# Patient Record
Sex: FEMALE | Race: WHITE | NOT HISPANIC OR LATINO | Employment: FULL TIME | URBAN - METROPOLITAN AREA
[De-identification: names, ages, dates, MRNs, and addresses within clinical notes are randomized per-mention and may not be internally consistent; named-entity substitution may affect disease eponyms.]

---

## 2021-04-19 RX ORDER — NORETHINDRONE ACETATE AND ETHINYL ESTRADIOL AND FERROUS FUMARATE 1MG-20(24)
1 KIT ORAL DAILY
Qty: 28 TABLET | Refills: 0 | Status: SHIPPED | OUTPATIENT
Start: 2021-04-19 | End: 2021-10-26 | Stop reason: SDUPTHER

## 2021-04-19 RX ORDER — NORETHINDRONE ACETATE AND ETHINYL ESTRADIOL AND FERROUS FUMARATE 1MG-20(24)
1 KIT ORAL DAILY
Qty: 28 TABLET | Refills: 0 | Status: SHIPPED | OUTPATIENT
Start: 2021-04-19 | End: 2021-04-19 | Stop reason: SDUPTHER

## 2021-06-23 RX ORDER — NORETHINDRONE ACETATE AND ETHINYL ESTRADIOL AND FERROUS FUMARATE 1MG-20(24)
KIT ORAL
Qty: 84 TABLET | OUTPATIENT
Start: 2021-06-23

## 2021-08-09 RX ORDER — NORETHINDRONE ACETATE AND ETHINYL ESTRADIOL AND FERROUS FUMARATE 1MG-20(24)
KIT ORAL
Qty: 84 TABLET | OUTPATIENT
Start: 2021-08-09

## 2021-10-26 ENCOUNTER — OFFICE VISIT (OUTPATIENT)
Dept: OBSTETRICS AND GYNECOLOGY | Facility: CLINIC | Age: 21
End: 2021-10-26

## 2021-10-26 VITALS
HEIGHT: 68 IN | WEIGHT: 293 LBS | SYSTOLIC BLOOD PRESSURE: 120 MMHG | BODY MASS INDEX: 44.41 KG/M2 | DIASTOLIC BLOOD PRESSURE: 70 MMHG

## 2021-10-26 DIAGNOSIS — Z11.3 SCREENING EXAMINATION FOR SEXUALLY TRANSMITTED DISEASE: ICD-10-CM

## 2021-10-26 DIAGNOSIS — Z30.41 ENCOUNTER FOR SURVEILLANCE OF CONTRACEPTIVE PILLS: ICD-10-CM

## 2021-10-26 DIAGNOSIS — Z01.419 ROUTINE GYNECOLOGICAL EXAMINATION: Primary | ICD-10-CM

## 2021-10-26 DIAGNOSIS — E66.01 MORBID OBESITY WITH BMI OF 50.0-59.9, ADULT (HCC): ICD-10-CM

## 2021-10-26 PROCEDURE — 99385 PREV VISIT NEW AGE 18-39: CPT | Performed by: NURSE PRACTITIONER

## 2021-10-26 RX ORDER — TRAZODONE HYDROCHLORIDE 50 MG/1
50 TABLET ORAL DAILY
COMMUNITY

## 2021-10-26 RX ORDER — NORETHINDRONE ACETATE AND ETHINYL ESTRADIOL AND FERROUS FUMARATE 1MG-20(24)
1 KIT ORAL DAILY
Qty: 84 TABLET | Refills: 3 | Status: SHIPPED | OUTPATIENT
Start: 2021-10-26 | End: 2022-10-28 | Stop reason: SDUPTHER

## 2021-10-26 RX ORDER — CITALOPRAM 40 MG/1
40 TABLET ORAL DAILY
COMMUNITY

## 2021-10-26 NOTE — PROGRESS NOTES
GYN Annual Exam     CC - Here for annual exam. Patient is an established patient    Subjective   HPI  Maria Alejandra Appiah is a 20 y.o. female, who presents for annual well woman exam.   Patient's last menstrual period was 10/04/2021 (exact date)..  Periods are regular and last 4 days  Dysmenorrhea:mild, occurring premenstrually.  Patient reports problems with: none.    Partner Status: Marital Status: single.  New Partners since last visit: yes.  Desires STD Screening: yes.  HPV vaccinated? : yes    Additional OB/GYN History   Current contraception: contraceptive methods: None  Desires to: wants to re-start Lo estrin 24 contraception    Last Pap : never, age 20  Last Completed Pap Smear     This patient has no relevant Health Maintenance data.        History of abnormal Pap smear: no  Family history of uterine, colon, breast, or ovarian cancer: no  Performs monthly Self-Breast Exam: yes  Exercises Regularly:no  Feelings of Anxiety or Depression: yes - treated by PCP  Tobacco Usage?: No   OB History    No obstetric history on file.         Health Maintenance   Topic Date Due   • ANNUAL PHYSICAL  Never done   • HPV VACCINES (1 - 2-dose series) Never done   • TDAP/TD VACCINES (1 - Tdap) 11/29/2019   • HEPATITIS C SCREENING  Never done   • CHLAMYDIA SCREENING  Never done   • INFLUENZA VACCINE  08/01/2021   • COVID-19 Vaccine  Completed   • Pneumococcal Vaccine 0-64  Aged Out   • MENINGOCOCCAL VACCINE  Aged Out       The additional following portions of the patient's history were reviewed and updated as appropriate: problem list.    Review of Systems   Constitutional: Negative.    HENT: Negative.    Eyes: Negative.    Respiratory: Negative.    Cardiovascular: Negative.    Gastrointestinal: Negative.    Endocrine: Negative.    Genitourinary: Negative.    Musculoskeletal: Negative.    Skin: Negative.    Allergic/Immunologic: Negative.    Neurological: Negative.    Hematological: Negative.    Psychiatric/Behavioral: Negative.   "  All other systems reviewed and are negative.      I have reviewed and agree with the HPI, ROS, and historical information as entered above. Emma Paradaer, APRN    Objective   /70   Ht 172.7 cm (68\")   Wt (!) 156 kg (344 lb)   LMP 10/04/2021 (Exact Date)   Breastfeeding No   BMI 52.31 kg/m²     Physical Exam  Vitals and nursing note reviewed. Exam conducted with a chaperone present.   Constitutional:       Appearance: Normal appearance.   HENT:      Head: Normocephalic and atraumatic.   Cardiovascular:      Rate and Rhythm: Normal rate and regular rhythm.   Pulmonary:      Effort: Pulmonary effort is normal.      Breath sounds: Normal breath sounds.   Abdominal:      Palpations: Abdomen is soft.   Genitourinary:     Labia:         Right: No rash, tenderness or lesion.         Left: No rash, tenderness or lesion.       Vagina: Normal. No lesions.      Cervix: No cervical motion tenderness, discharge, lesion or cervical bleeding.      Uterus: Normal. Not enlarged, not fixed and not tender.       Adnexa:         Right: No mass or tenderness.          Left: No mass or tenderness.        Rectum: No external hemorrhoid.      Comments: Chaperone Present  Neurological:      Mental Status: She is alert and oriented to person, place, and time.   Psychiatric:         Behavior: Behavior normal.           Assessment/Plan     Assessment     Problem List Items Addressed This Visit     None      Visit Diagnoses     Routine gynecological examination    -  Primary    Relevant Orders    Chlamydia trachomatis, Neisseria gonorrhoeae, Trichomonas vaginalis, PCR - Swab, Cervix    Screening examination for sexually transmitted disease        Encounter for surveillance of contraceptive pills        Morbid obesity with BMI of 50.0-59.9, adult (HCC)              1. GYN annual well woman exam.       Plan     1. Encouraged use of condoms for STD prevention.  2. OCP's/Patch/Vaginal Ring - Discussed side effects of nausea, BTB, " headaches, breast tenderness and slight weight gain in the first three cycles.  Understands risks of blood clots, stroke, and theoretical risk of breast cancer.  Denies family history of blood clots.  3. Reviewed BMI and weight loss as preventative health measures.   4. RTC in 1 year or PRN with problems      Emma Sorto, APRN  10/26/2021

## 2021-10-28 LAB
C TRACH RRNA SPEC QL NAA+PROBE: NEGATIVE
N GONORRHOEA RRNA SPEC QL NAA+PROBE: NEGATIVE
T VAGINALIS DNA SPEC QL NAA+PROBE: NEGATIVE

## 2022-10-28 RX ORDER — NORETHINDRONE ACETATE AND ETHINYL ESTRADIOL AND FERROUS FUMARATE 1MG-20(24)
1 KIT ORAL DAILY
Qty: 84 TABLET | Refills: 0 | Status: SHIPPED | OUTPATIENT
Start: 2022-10-28 | End: 2023-01-05 | Stop reason: ALTCHOICE

## 2022-10-28 NOTE — TELEPHONE ENCOUNTER
Received Rx refill request  Last annual with NISHA Sorto, APRN 10/26/21  No future appt.    S/w pt- Patient notified of need for appt. Scheduled appt for 11/22/22 @ 9:45am. Rx refilled until next appt.

## 2023-01-05 ENCOUNTER — OFFICE VISIT (OUTPATIENT)
Dept: FAMILY MEDICINE CLINIC | Facility: CLINIC | Age: 23
End: 2023-01-05
Payer: COMMERCIAL

## 2023-01-05 VITALS
WEIGHT: 293 LBS | DIASTOLIC BLOOD PRESSURE: 92 MMHG | TEMPERATURE: 98.2 F | SYSTOLIC BLOOD PRESSURE: 116 MMHG | BODY MASS INDEX: 44.41 KG/M2 | HEIGHT: 68 IN | HEART RATE: 103 BPM | OXYGEN SATURATION: 97 %

## 2023-01-05 DIAGNOSIS — R05.1 ACUTE COUGH: Primary | ICD-10-CM

## 2023-01-05 PROBLEM — F31.9 BIPOLAR DISORDER: Status: ACTIVE | Noted: 2023-01-05

## 2023-01-05 PROBLEM — F41.8 MIXED ANXIETY DEPRESSIVE DISORDER: Status: ACTIVE | Noted: 2023-01-05

## 2023-01-05 PROCEDURE — 99213 OFFICE O/P EST LOW 20 MIN: CPT | Performed by: NURSE PRACTITIONER

## 2023-01-05 RX ORDER — LAMOTRIGINE 200 MG/1
200 TABLET ORAL DAILY
COMMUNITY

## 2023-01-05 RX ORDER — ALBUTEROL SULFATE 90 UG/1
AEROSOL, METERED RESPIRATORY (INHALATION)
COMMUNITY
Start: 2023-01-01 | End: 2023-02-15

## 2023-01-05 RX ORDER — ALBUTEROL SULFATE 2.5 MG/3ML
2.5 SOLUTION RESPIRATORY (INHALATION) ONCE
Status: COMPLETED | OUTPATIENT
Start: 2023-01-05 | End: 2023-01-05

## 2023-01-05 RX ORDER — TOPIRAMATE 50 MG/1
50 TABLET, FILM COATED ORAL DAILY
COMMUNITY

## 2023-01-05 RX ORDER — OLANZAPINE 10 MG/1
10 TABLET ORAL DAILY
COMMUNITY
Start: 2022-12-11

## 2023-01-05 RX ORDER — BENZONATATE 100 MG/1
CAPSULE ORAL
COMMUNITY
Start: 2023-01-02 | End: 2023-02-15

## 2023-01-05 RX ADMIN — ALBUTEROL SULFATE 2.5 MG: 2.5 SOLUTION RESPIRATORY (INHALATION) at 13:53

## 2023-01-05 NOTE — LETTER
January 5, 2023     Patient: Maria Alejandra Appiah   YOB: 2000   Date of Visit: 1/5/2023       To Whom It May Concern:    It is my medical opinion that Maria Alejandra Appiah should remain out of work until 1/8/23.           Sincerely,        ARNOLD Hernandez    CC:   No Recipients

## 2023-01-05 NOTE — PROGRESS NOTES
Chief Complaint  Hospital Follow Up Visit    Subjective        Maria Alejandra Appiah presents to [facility]  History of Present Illness She was seen in the ER for bronchitis. She began to have symptoms last week.  She was (+) for flu and these symptoms have not gone away.  Her CXR was (-). She has a lot of fatigue.  She was given an antibiotic in the ER and continues to take this.  She has a home nebulizer, but does not like to use this because it makes her so nervous and makes her anxiety worse.    Objective   Vital Signs:  /92 (BP Location: Left arm, Patient Position: Sitting, Cuff Size: Adult)   Pulse 103   Temp 98.2 °F (36.8 °C) (Temporal)   Ht 172.7 cm (68\")   Wt (!) 163 kg (359 lb 6.4 oz)   SpO2 97%   BMI 54.65 kg/m²   Estimated body mass index is 54.65 kg/m² as calculated from the following:    Height as of this encounter: 172.7 cm (68\").    Weight as of this encounter: 163 kg (359 lb 6.4 oz).          Physical Exam  Constitutional:       Appearance: She is obese.   HENT:      Right Ear: Tympanic membrane, ear canal and external ear normal.      Left Ear: Tympanic membrane, ear canal and external ear normal.      Mouth/Throat:      Mouth: Mucous membranes are moist.   Cardiovascular:      Rate and Rhythm: Normal rate and regular rhythm.   Pulmonary:      Effort: Pulmonary effort is normal. No respiratory distress.      Breath sounds: No stridor. Wheezing and rhonchi present.   Musculoskeletal:      Cervical back: Normal range of motion.   Skin:     General: Skin is warm and dry.   Neurological:      Mental Status: She is alert.        Result Review :                Assessment and Plan   Diagnoses and all orders for this visit:    1. Acute cough (Primary)  Assessment & Plan:  I gave her nebulized treatment here in the office today and she tolerated this really well.  I instructed her to use the albuterol inhaler even though it makes her anxious, this will only be  temporary.  She needs something to loosen her up.  Because of her acute anxiety and panic disorder, I did not feel like she would benefit from steroids.    Orders:  -     albuterol (PROVENTIL) nebulizer solution 0.083% 2.5 mg/3mL  -     Nebulizer Treatment           Follow Up   Return in 2 weeks (on 1/19/2023), or if symptoms worsen or fail to improve.  Patient was given instructions and counseling regarding her condition or for health maintenance advice. Please see specific information pulled into the AVS if appropriate.

## 2023-01-05 NOTE — ASSESSMENT & PLAN NOTE
I gave her nebulized treatment here in the office today and she tolerated this really well.  I instructed her to use the albuterol inhaler even though it makes her anxious, this will only be temporary.  She needs something to loosen her up.  Because of her acute anxiety and panic disorder, I did not feel like she would benefit from steroids.

## 2023-01-20 ENCOUNTER — LAB (OUTPATIENT)
Dept: FAMILY MEDICINE CLINIC | Facility: CLINIC | Age: 23
End: 2023-01-20
Payer: COMMERCIAL

## 2023-01-20 DIAGNOSIS — Z79.899 ENCOUNTER FOR LONG-TERM (CURRENT) USE OF OTHER MEDICATIONS: Primary | ICD-10-CM

## 2023-01-20 DIAGNOSIS — Z11.59 ENCOUNTER FOR HEPATITIS C SCREENING TEST FOR LOW RISK PATIENT: ICD-10-CM

## 2023-01-20 PROCEDURE — 36415 COLL VENOUS BLD VENIPUNCTURE: CPT | Performed by: NURSE PRACTITIONER

## 2023-01-21 LAB
ALBUMIN SERPL-MCNC: 4.5 G/DL (ref 3.9–5)
ALBUMIN/GLOB SERPL: 1.5 {RATIO} (ref 1.2–2.2)
ALP SERPL-CCNC: 99 IU/L (ref 44–121)
ALT SERPL-CCNC: 30 IU/L (ref 0–32)
AST SERPL-CCNC: 31 IU/L (ref 0–40)
BASOPHILS # BLD AUTO: 0.1 X10E3/UL (ref 0–0.2)
BASOPHILS NFR BLD AUTO: 1 %
BILIRUB SERPL-MCNC: 0.5 MG/DL (ref 0–1.2)
BUN SERPL-MCNC: 7 MG/DL (ref 6–20)
BUN/CREAT SERPL: 12 (ref 9–23)
CALCIUM SERPL-MCNC: 9.3 MG/DL (ref 8.7–10.2)
CHLORIDE SERPL-SCNC: 106 MMOL/L (ref 96–106)
CHOLEST SERPL-MCNC: 183 MG/DL (ref 100–199)
CK SERPL-CCNC: 81 U/L (ref 32–182)
CO2 SERPL-SCNC: 21 MMOL/L (ref 20–29)
CREAT SERPL-MCNC: 0.59 MG/DL (ref 0.57–1)
EGFRCR SERPLBLD CKD-EPI 2021: 131 ML/MIN/1.73
EOSINOPHIL # BLD AUTO: 0.4 X10E3/UL (ref 0–0.4)
EOSINOPHIL NFR BLD AUTO: 3 %
ERYTHROCYTE [DISTWIDTH] IN BLOOD BY AUTOMATED COUNT: 14.1 % (ref 11.7–15.4)
GLOBULIN SER CALC-MCNC: 3 G/DL (ref 1.5–4.5)
GLUCOSE SERPL-MCNC: 98 MG/DL (ref 70–99)
HCT VFR BLD AUTO: 39 % (ref 34–46.6)
HCV AB S/CO SERPL IA: <0.1 S/CO RATIO (ref 0–0.9)
HDLC SERPL-MCNC: 37 MG/DL
HGB BLD-MCNC: 12.7 G/DL (ref 11.1–15.9)
IMM GRANULOCYTES # BLD AUTO: 0 X10E3/UL (ref 0–0.1)
IMM GRANULOCYTES NFR BLD AUTO: 0 %
LDLC SERPL CALC-MCNC: 118 MG/DL (ref 0–99)
LYMPHOCYTES # BLD AUTO: 3.5 X10E3/UL (ref 0.7–3.1)
LYMPHOCYTES NFR BLD AUTO: 27 %
MCH RBC QN AUTO: 26.3 PG (ref 26.6–33)
MCHC RBC AUTO-ENTMCNC: 32.6 G/DL (ref 31.5–35.7)
MCV RBC AUTO: 81 FL (ref 79–97)
MONOCYTES # BLD AUTO: 0.6 X10E3/UL (ref 0.1–0.9)
MONOCYTES NFR BLD AUTO: 5 %
NEUTROPHILS # BLD AUTO: 8.5 X10E3/UL (ref 1.4–7)
NEUTROPHILS NFR BLD AUTO: 64 %
PLATELET # BLD AUTO: 314 X10E3/UL (ref 150–450)
POTASSIUM SERPL-SCNC: 4.2 MMOL/L (ref 3.5–5.2)
PROT SERPL-MCNC: 7.5 G/DL (ref 6–8.5)
RBC # BLD AUTO: 4.83 X10E6/UL (ref 3.77–5.28)
SODIUM SERPL-SCNC: 140 MMOL/L (ref 134–144)
TRIGL SERPL-MCNC: 157 MG/DL (ref 0–149)
TSH SERPL DL<=0.005 MIU/L-ACNC: 1.85 UIU/ML (ref 0.45–4.5)
VLDLC SERPL CALC-MCNC: 28 MG/DL (ref 5–40)
WBC # BLD AUTO: 13.1 X10E3/UL (ref 3.4–10.8)

## 2023-02-15 ENCOUNTER — TELEMEDICINE (OUTPATIENT)
Dept: FAMILY MEDICINE CLINIC | Facility: CLINIC | Age: 23
End: 2023-02-15
Payer: COMMERCIAL

## 2023-02-15 DIAGNOSIS — J02.9 ACUTE PHARYNGITIS, UNSPECIFIED ETIOLOGY: Primary | ICD-10-CM

## 2023-02-15 PROCEDURE — 99214 OFFICE O/P EST MOD 30 MIN: CPT | Performed by: FAMILY MEDICINE

## 2023-02-15 RX ORDER — METHYLPREDNISOLONE 4 MG/1
TABLET ORAL
Qty: 21 TABLET | Refills: 0 | Status: SHIPPED | OUTPATIENT
Start: 2023-02-15

## 2023-02-15 RX ORDER — AMOXICILLIN 875 MG/1
875 TABLET, COATED ORAL 2 TIMES DAILY
Qty: 20 TABLET | Refills: 0 | Status: SHIPPED | OUTPATIENT
Start: 2023-02-15 | End: 2023-02-20

## 2023-02-15 NOTE — PROGRESS NOTES
Telehealth E-Visit      Date: 02/15/2023   Patient Name: Maria Alejandra Appiah  : 2000   MRN: [id number]     Chief Complaint:    Chief Complaint   Patient presents with   • Sore Throat   • Headache   • Nasal Congestion   Telehealth Visit completed via MY CHART for video conferencing, patient here for telehealth visit. Patient understands the limitations of the visit with telehealth given limitations in the exam findings but patient is agreeable to this telemedicine visit due to concerns with COVID 19 exposure if patient were to be present at the office at this time    Patient location: At home  Provider location: [facility]  Patient has chosen to receive care through telemedicine the patient does give consent to use video audio for medical care today.    I have reviewed the E-Visit questionnaire and the patient's answers, my assessment and plan are listed below.     History of Present Illness: Maria Alejandra Appiah is a 22 y.o. female who is here today to discuss a few days of sore throat, nasal congestion, cough she also endorses a history of recurrent tonsillitis with her tonsils currently swollen.  No fever but excessive fatigue and lethargy.  No wheezing or trouble breathing      Subjective      Review of Systems:   Review of Systems   Constitutional: Negative.    HENT: Positive for congestion, rhinorrhea, sinus pressure, sneezing, sore throat and swollen glands.    Eyes: Negative.    Respiratory: Positive for cough.    Cardiovascular: Negative.    Gastrointestinal: Negative.    Endocrine: Negative.    Genitourinary: Negative.    Musculoskeletal: Negative.    Skin: Negative.    Allergic/Immunologic: Negative.    Neurological: Negative.    Hematological: Negative.    Psychiatric/Behavioral: Negative.        I have reviewed and the following portions of the patient's history were updated as appropriate: past family history, past medical history, past social history, past  surgical history and problem list.    Medications:     Current Outpatient Medications:   •  citalopram (CeleXA) 40 MG tablet, Take 40 mg by mouth Daily., Disp: , Rfl:   •  lamoTRIgine (LaMICtal) 200 MG tablet, Take 200 mg by mouth Daily., Disp: , Rfl:   •  OLANZapine (zyPREXA) 10 MG tablet, Take 10 mg by mouth Daily., Disp: , Rfl:   •  topiramate (TOPAMAX) 50 MG tablet, Take 50 mg by mouth Daily., Disp: , Rfl:   •  traZODone (DESYREL) 50 MG tablet, Take 50 mg by mouth Daily., Disp: , Rfl:   •  amoxicillin (AMOXIL) 875 MG tablet, Take 1 tablet by mouth 2 (Two) Times a Day for 10 days., Disp: 20 tablet, Rfl: 0  •  methylPREDNISolone (MEDROL) 4 MG dose pack, Take as directed on package instructions., Disp: 21 tablet, Rfl: 0    Allergies:   No Known Allergies    Objective     Physical Exam:  Vital Signs: There were no vitals filed for this visit.  There is no height or weight on file to calculate BMI.    Physical Exam  Vitals reviewed: Exam is done and limited to telemedicine due to COVID.           Assessment / Plan      Assessment/Plan:   Diagnoses and all orders for this visit:    1. Acute pharyngitis, unspecified etiology (Primary)  Suspect acute pharyngitis we will go ahead and do oral amoxicillin with Medrol Dosepak return precaution provided if there is no improvement of symptoms patient voiced full understanding    Other orders  -     amoxicillin (AMOXIL) 875 MG tablet; Take 1 tablet by mouth 2 (Two) Times a Day for 10 days.  Dispense: 20 tablet; Refill: 0  -     methylPREDNISolone (MEDROL) 4 MG dose pack; Take as directed on package instructions.  Dispense: 21 tablet; Refill: 0       PATIENT HAS BEEN ADVISED WHILE ON NEW MEDICATION PRESCRIBED IT IS IMPORTANT TO USE BACK UP CONTRACEPTION OR BACK UP BIRTH CONTROL AS THE USE OF THIS MEDICATION WITH PREGNANCY COULD CAUSE POTENTIAL RISKS IF PATIENT DOES BECOME PREGNANT.    MEDICATION SIDE EFFECTS AND RISKS HAVE BEEN DISCUSSED WITH PATIENT. PATIENT NOTES  UNDERSTANDING. IF ANY CONCERN OR QUESTION REGARDING MEDICATION PLEASE CONTACT.   Follow Up:   No follow-ups on file.      35 minutes were spent reviewing the patient's questionnaire, formulating a treatment plan, and relaying information to the patient via spotfluxhart.    Ethel Hardy DO  [facility]   02/16/23  11:23 EST

## 2023-02-20 ENCOUNTER — OFFICE VISIT (OUTPATIENT)
Dept: FAMILY MEDICINE CLINIC | Facility: CLINIC | Age: 23
End: 2023-02-20
Payer: COMMERCIAL

## 2023-02-20 VITALS
HEIGHT: 68 IN | WEIGHT: 293 LBS | SYSTOLIC BLOOD PRESSURE: 132 MMHG | HEART RATE: 102 BPM | TEMPERATURE: 98.9 F | BODY MASS INDEX: 44.41 KG/M2 | OXYGEN SATURATION: 96 % | DIASTOLIC BLOOD PRESSURE: 80 MMHG

## 2023-02-20 DIAGNOSIS — J02.0 STREP PHARYNGITIS: Primary | ICD-10-CM

## 2023-02-20 LAB
EXPIRATION DATE: NORMAL
INTERNAL CONTROL: NORMAL
Lab: NORMAL
S PYO AG THROAT QL: NEGATIVE

## 2023-02-20 PROCEDURE — 87880 STREP A ASSAY W/OPTIC: CPT | Performed by: PHYSICIAN ASSISTANT

## 2023-02-20 PROCEDURE — 99213 OFFICE O/P EST LOW 20 MIN: CPT | Performed by: PHYSICIAN ASSISTANT

## 2023-02-20 RX ORDER — CEPHALEXIN 500 MG/1
1000 CAPSULE ORAL 2 TIMES DAILY
Qty: 40 CAPSULE | Refills: 0 | Status: SHIPPED | OUTPATIENT
Start: 2023-02-20

## 2023-02-20 NOTE — PROGRESS NOTES
"      Patient Office Visit      Patient Name: Maria Alejandra Appiah  : 2000   MRN: [id number]     Chief Complaint:    Chief Complaint   Patient presents with   • Sore Throat       History of Present Illness: Maria Alejandra Appiah is a 22 y.o. female who is here today with continued sore throat.  She was diagnosed with strep throat last week and her throat is not getting any better on amoxicillin.  She says she has had strep about 8 times in the past year and wants to see ENT about having her tonsils removed.    Subjective      Review of Systems:         Past Medical History:   Past Medical History:   Diagnosis Date   • Anxiety    • Insomnia        Past Surgical History:   Past Surgical History:   Procedure Laterality Date   • LAPAROSCOPIC CHOLECYSTECTOMY     • WISDOM TOOTH EXTRACTION         Family History:   Family History   Problem Relation Age of Onset   • Hypertension Paternal Uncle        Social History:   Social History     Socioeconomic History   • Marital status: Single   • Number of children: 0   Tobacco Use   • Smoking status: Never     Passive exposure: Never   • Smokeless tobacco: Never   Vaping Use   • Vaping Use: Never used   Substance and Sexual Activity   • Alcohol use: Not Currently   • Drug use: Never   • Sexual activity: Yes     Partners: Male     Birth control/protection: OCP       Allergies:   No Known Allergies    Objective     Physical Exam:  Vital Signs:   Vitals:    23 1504 23 1513   BP: 140/70 132/80   BP Location: Left arm Right arm   Patient Position: Sitting Sitting   Cuff Size: Large Adult Adult   Pulse: 102    Temp: 98.9 °F (37.2 °C)    TempSrc: Temporal    SpO2: 96%    Weight: (!) 162 kg (357 lb 6.4 oz)    Height: 172.7 cm (68\")    PainSc:   5    PainLoc: Throat      Body mass index is 54.34 kg/m².        Physical Exam  Constitutional:       General: She is not in acute distress.     Appearance: Normal appearance. She is obese.   HENT:      Mouth/Throat:      Pharynx: " Pharyngeal swelling and posterior oropharyngeal erythema present.      Tonsils: 3+ on the right. 3+ on the left.   Neurological:      Mental Status: She is alert.   Psychiatric:         Mood and Affect: Mood normal.         Behavior: Behavior normal.         Thought Content: Thought content normal.         Judgment: Judgment normal.         Procedures    Assessment / Plan      Assessment/Plan:   Diagnoses and all orders for this visit:    1. Strep pharyngitis (Primary)  -     cephalexin (Keflex) 500 MG capsule; Take 2 capsules by mouth 2 (Two) Times a Day.  Dispense: 40 capsule; Refill: 0  -     Ambulatory Referral to ENT (Otolaryngology)  -     POC Rapid Strep A         Still testing positive for strep.  We will change her antibiotic to Keflex and refer to ENT to discuss tonsillectomy.    Medications:     Current Outpatient Medications:   •  citalopram (CeleXA) 40 MG tablet, Take 40 mg by mouth Daily., Disp: , Rfl:   •  lamoTRIgine (LaMICtal) 200 MG tablet, Take 200 mg by mouth Daily., Disp: , Rfl:   •  methylPREDNISolone (MEDROL) 4 MG dose pack, Take as directed on package instructions., Disp: 21 tablet, Rfl: 0  •  OLANZapine (zyPREXA) 10 MG tablet, Take 10 mg by mouth Daily., Disp: , Rfl:   •  topiramate (TOPAMAX) 50 MG tablet, Take 50 mg by mouth Daily., Disp: , Rfl:   •  traZODone (DESYREL) 50 MG tablet, Take 50 mg by mouth Daily., Disp: , Rfl:   •  cephalexin (Keflex) 500 MG capsule, Take 2 capsules by mouth 2 (Two) Times a Day., Disp: 40 capsule, Rfl: 0        Follow Up:   No follow-ups on file.    Xiomara Culp PA-C   [facility]

## 2023-05-09 ENCOUNTER — TELEPHONE (OUTPATIENT)
Dept: OBSTETRICS AND GYNECOLOGY | Facility: CLINIC | Age: 23
End: 2023-05-09
Payer: COMMERCIAL

## 2023-05-09 NOTE — TELEPHONE ENCOUNTER
Returned patient's call. States she just left ER in [place] an hour ago. Was seen for heavy period bleeding. Cycle started 5/6/23; heavy since 5/7/23. States is soaking 1-2 pads per hour since Sunday. Labs were done but no ultrasound. States pregnancy test was negative. Currently using condoms for contraception. Feels tired. Denies other problems. Advised her to follow instructions given in ER and go back to ER if symptoms worsen. Encouraged oral hydration. Appointment scheduled here for tomorrow. Fax number given, requested she contact [place] [facility] to have records sent here. Patient v/u and agreed.

## 2023-05-09 NOTE — TELEPHONE ENCOUNTER
Provider: DR WASHINGTON   Caller: NANCY PORTILLO  Relationship to Patient: SELF  Pharmacy: [facility]   Phone Number: [phone]  Reason for Call: PT HAS BEEN GOING THRU A PAD OR MORE PER HOUR  SINCE Sunday.  THIS IS VERY UNUSUAL FOR HER.  HER BACK IS HURTING AND SHE IS HAVING SOME ABDOMINAL PAIN.  SHE ALSO HAS NAUSEA.  PT DID GO TO THE [facility].  THEY DX HER WITH ABNORMAL UTERINE BLEEDING. THEY TESTED HER FOR PREGNANCY, DID SOME LAB WORK, GAVE HER SOME PHENEGRAN, STRONG IBUPROFEN, AND STARTED HER ON BIRTH CONTROL (MICROGESTIN) FOR 2 MONTHS.  THEY ADVISED PT TO F/U WITH HER OBGYN.  PT WOULD LIKE TO BE SEEN ASAP.  PLEASE CALL PT TO ADVISE   When was the patient last seen: 10/2021

## 2023-05-10 ENCOUNTER — OFFICE VISIT (OUTPATIENT)
Dept: OBSTETRICS AND GYNECOLOGY | Facility: CLINIC | Age: 23
End: 2023-05-10
Payer: COMMERCIAL

## 2023-05-10 VITALS
DIASTOLIC BLOOD PRESSURE: 80 MMHG | HEIGHT: 68 IN | SYSTOLIC BLOOD PRESSURE: 120 MMHG | WEIGHT: 293 LBS | BODY MASS INDEX: 44.41 KG/M2

## 2023-05-10 DIAGNOSIS — E66.01 MORBID OBESITY WITH BMI OF 50.0-59.9, ADULT: ICD-10-CM

## 2023-05-10 DIAGNOSIS — N92.0 MENORRHAGIA WITH REGULAR CYCLE: Primary | ICD-10-CM

## 2023-05-10 DIAGNOSIS — N93.8 DUB (DYSFUNCTIONAL UTERINE BLEEDING): ICD-10-CM

## 2023-05-10 RX ORDER — NORETHINDRONE ACETATE AND ETHINYL ESTRADIOL 1; .02 MG/1; MG/1
1 TABLET ORAL DAILY
Qty: 90 TABLET | Refills: 3 | Status: SHIPPED | OUTPATIENT
Start: 2023-05-10

## 2023-05-10 RX ORDER — NORETHINDRONE ACETATE AND ETHINYL ESTRADIOL 1; .02 MG/1; MG/1
1 TABLET ORAL DAILY
COMMUNITY
End: 2023-05-10 | Stop reason: SDUPTHER

## 2023-05-10 NOTE — PROGRESS NOTES
Chief Complaint   Patient presents with   • Menorrhagia         Subjective   HPI  Maria Alejandra Appiah is a 22 y.o. female, No obstetric history on file., her last LMP was Patient's last menstrual period was 05/06/2023 (approximate)..  She presents for a follow up evaluation of Menorrhagia and Dysmenorrhea. The patient was seen at [place] [facility] yesterday after waking up drenched in blood. She states that she has been soaking thru a pad every hour since it started. She is passing large clots and has back pain. The ER started her on OCPs to control her cycle and she started them yesterday. Her HGB in the ER was 11.2 and HCT was 35.6.  US was not done today.     Additional OB/GYN History   Last Pap :   Last Completed Pap Smear     This patient has no relevant Health Maintenance data.        History of abnormal Pap smear: no  Tobacco Usage?: No       Current Outpatient Medications:   •  norethindrone-ethinyl estradiol (MICROGESTIN 1/20) 1-20 MG-MCG per tablet, Take 1 tablet by mouth Daily., Disp: 90 tablet, Rfl: 3  •  citalopram (CeleXA) 40 MG tablet, Take 40 mg by mouth Daily., Disp: , Rfl:   •  lamoTRIgine (LaMICtal) 200 MG tablet, Take 200 mg by mouth Daily., Disp: , Rfl:   •  OLANZapine (zyPREXA) 10 MG tablet, Take 10 mg by mouth Daily., Disp: , Rfl:   •  topiramate (TOPAMAX) 50 MG tablet, Take 50 mg by mouth Daily., Disp: , Rfl:   •  traZODone (DESYREL) 50 MG tablet, Take 50 mg by mouth Daily., Disp: , Rfl:      Past Medical History:   Diagnosis Date   • Anxiety    • Insomnia         Past Surgical History:   Procedure Laterality Date   • LAPAROSCOPIC CHOLECYSTECTOMY     • WISDOM TOOTH EXTRACTION         The additional following portions of the patient's history were reviewed and updated as appropriate: allergies, current medications, past family history, past medical history, past social history, past surgical history and problem list.    Review of Systems   Genitourinary: Positive for menstrual problem.   All  "other systems reviewed and are negative.      I have reviewed and agree with the HPI, ROS, and historical information as entered above. Aurora Palacios MD    Objective   /80   Ht 172.7 cm (68\")   Wt (!) 161 kg (355 lb)   LMP 05/06/2023 (Approximate)   BMI 53.98 kg/m²     Physical Exam    General:  well developed; well nourished  no acute distress, BMI 53  Skin:  No suspicious lesions seen  Abdomen: soft, non-tender; no masses  no umbilical or inguinal hernias are present  Pelvis: Deferred  Ext: 2+ pulses, no edema    Assessment & Plan     Assessment     Problem List Items Addressed This Visit     Menorrhagia with regular cycle - Primary    Relevant Orders    US Non-ob Transvaginal    CBC (No Diff)    HCG, B-subunit, Quantitative    DHEA-Sulfate (Completed)    Estradiol (Completed)    Follicle Stimulating Hormone (Completed)    Luteinizing Hormone (Completed)    17-Hydroxyprogesterone (Completed)    TSH (Completed)    Testosterone Free Direct (Completed)    Hemoglobin A1c (Completed)    Morbid obesity with BMI of 50.0-59.9, adult    Relevant Orders    DHEA-Sulfate (Completed)    Estradiol (Completed)    Follicle Stimulating Hormone (Completed)    Luteinizing Hormone (Completed)    17-Hydroxyprogesterone (Completed)    TSH (Completed)    Testosterone Free Direct (Completed)    Hemoglobin A1c (Completed)    DUB (dysfunctional uterine bleeding)         Plan     Call for heavy bleeding  Reviewed ultrasound findings  Lab(s) Ordered  Medication(s) Ordered  Discussed potential etiologies and treatment options.   Follow Up: Return in about 3 months (around 8/10/2023) for Annual physical.      Aurora Palacios MD  05/10/2023  "

## 2023-05-11 ENCOUNTER — TELEPHONE (OUTPATIENT)
Dept: OBSTETRICS AND GYNECOLOGY | Facility: CLINIC | Age: 23
End: 2023-05-11
Payer: COMMERCIAL

## 2023-05-11 NOTE — TELEPHONE ENCOUNTER
PT has started vomiting since taking new bc past couple of days and wanting to know if it could be caused by the med's.

## 2023-05-11 NOTE — TELEPHONE ENCOUNTER
Returned patient's call. Started new OCPs 2 days ago. Onset of nausea last night. Took some Phenergan that she already had @ 0430. Emesis x2 this morning. States family members have same symptoms. Discussed with ARNOLD Kilpatrick. Advised her not likely caused by OCPs. Try to stay hydrated. Should go to ER if unable to keep anything down for 24 hours. V/U and agreed.

## 2023-05-16 LAB
17OHP SERPL-MCNC: 10 NG/DL
DHEA-S SERPL-MCNC: 149 UG/DL (ref 110–431.7)
ERYTHROCYTE [DISTWIDTH] IN BLOOD BY AUTOMATED COUNT: 14.1 % (ref 11.7–15.4)
ESTRADIOL SERPL-MCNC: 36.1 PG/ML
FSH SERPL-ACNC: 4.8 MIU/ML
HBA1C MFR BLD: 7 % (ref 4.8–5.6)
HCG INTACT+B SERPL-ACNC: <1 MIU/ML
HCT VFR BLD AUTO: 39.3 % (ref 34–46.6)
HGB BLD-MCNC: 12.6 G/DL (ref 11.1–15.9)
LH SERPL-ACNC: 5 MIU/ML
MCH RBC QN AUTO: 25.9 PG (ref 26.6–33)
MCHC RBC AUTO-ENTMCNC: 32.1 G/DL (ref 31.5–35.7)
MCV RBC AUTO: 81 FL (ref 79–97)
PLATELET # BLD AUTO: 408 X10E3/UL (ref 150–450)
RBC # BLD AUTO: 4.86 X10E6/UL (ref 3.77–5.28)
TESTOST FREE SERPL-MCNC: 0.4 PG/ML (ref 0–4.2)
TSH SERPL DL<=0.005 MIU/L-ACNC: 1.53 UIU/ML (ref 0.45–4.5)
WBC # BLD AUTO: 16.3 X10E3/UL (ref 3.4–10.8)

## 2023-05-17 PROBLEM — N93.8 DUB (DYSFUNCTIONAL UTERINE BLEEDING): Status: ACTIVE | Noted: 2023-05-17

## 2023-05-17 PROBLEM — E66.01 MORBID OBESITY WITH BMI OF 50.0-59.9, ADULT: Status: ACTIVE | Noted: 2023-05-17

## 2023-05-17 NOTE — PROGRESS NOTES
Patient is diabetic.  Please f/u with PCP re: treatment.  Otherwise, hormones appear normal.  Please work on diet/exercise/weight loss to help blood sugars and bleeding.

## 2023-05-22 ENCOUNTER — OFFICE VISIT (OUTPATIENT)
Dept: FAMILY MEDICINE CLINIC | Facility: CLINIC | Age: 23
End: 2023-05-22
Payer: COMMERCIAL

## 2023-05-22 VITALS
HEIGHT: 68 IN | HEART RATE: 84 BPM | SYSTOLIC BLOOD PRESSURE: 128 MMHG | WEIGHT: 293 LBS | OXYGEN SATURATION: 97 % | DIASTOLIC BLOOD PRESSURE: 70 MMHG | BODY MASS INDEX: 44.41 KG/M2 | TEMPERATURE: 98.2 F

## 2023-05-22 DIAGNOSIS — E11.65 TYPE 2 DIABETES MELLITUS WITH HYPERGLYCEMIA, WITHOUT LONG-TERM CURRENT USE OF INSULIN: Primary | ICD-10-CM

## 2023-05-22 DIAGNOSIS — B36.9 FUNGAL DERMATOSIS: ICD-10-CM

## 2023-05-22 LAB — GLUCOSE BLDC GLUCOMTR-MCNC: 126 MG/DL (ref 70–130)

## 2023-05-22 PROCEDURE — 99214 OFFICE O/P EST MOD 30 MIN: CPT | Performed by: NURSE PRACTITIONER

## 2023-05-22 RX ORDER — PANTOPRAZOLE SODIUM 40 MG/1
40 TABLET, DELAYED RELEASE ORAL
COMMUNITY
End: 2023-05-22

## 2023-05-22 RX ORDER — CLOTRIMAZOLE AND BETAMETHASONE DIPROPIONATE 10; .64 MG/G; MG/G
1 CREAM TOPICAL 2 TIMES DAILY
Qty: 120 G | Refills: 0 | Status: SHIPPED | OUTPATIENT
Start: 2023-05-22

## 2023-05-22 NOTE — TELEPHONE ENCOUNTER
Caller: Maria Alejandra Appiah    Relationship: Self    Best call back number:[phone]  What medication are you requesting: GLUCOSE MONITOR. EITHER ONE THAT IS WORN OR FINGER STICK KIND    What are your current symptoms: DIABETIC     How long have you been experiencing symptoms: FOUND OUT TODAY FOR SURE     Have you had these symptoms before:    [x] Yes  [] No    Have you been treated for these symptoms before:   [x] Yes  [] No    If a prescription is needed, what is your preferred pharmacy and phone number: [id number] - [place], [place] - [address]  & [address] - [phone]  - [phone]      Additional notes:    INSURANCE SAID THEY NEED A PRESCRIPTION FOR THIS TO BE COVERED

## 2023-05-22 NOTE — ASSESSMENT & PLAN NOTE
Diabetes is worsening.   Continue current treatment regimen.  Reminded to bring in blood sugar diary at next visit.  Dietary recommendations for ADA diet.  Regular aerobic exercise.  Discussed ways to avoid symptomatic hypoglycemia.  Discussed foot care.  Diabetes will be reassessed in 1 month. She was able to demonstrate how to properly do a fsbs. We discussed weight management and diet and she was given pt. Education materials.

## 2023-05-22 NOTE — PROGRESS NOTES
"Chief Complaint  Rash (R arm. Pt has tried creams. Dry and itching in nature. X2M) and Diabetes    Subjective        Maria Alejandra Appiah presents to [facility]  History of Present Illness she was told that her last A1c was 7%. She has never been treated for t his before. She states that she has a family hx of this. She states that she is always thirsty, fatigue and frequent urination. #2 large round rash on the right arm. She has had this since March. She has tried several otc creams with no results. She has some burning.     Objective   Vital Signs:  /70 (BP Location: Left arm, Patient Position: Sitting, Cuff Size: Large Adult)   Pulse 84   Temp 98.2 °F (36.8 °C) (Temporal)   Ht 172.7 cm (68\")   Wt (!) 162 kg (356 lb 1.6 oz)   SpO2 97%   BMI 54.14 kg/m²   Estimated body mass index is 54.14 kg/m² as calculated from the following:    Height as of this encounter: 172.7 cm (68\").    Weight as of this encounter: 162 kg (356 lb 1.6 oz).             Physical Exam  Vitals and nursing note reviewed.   Constitutional:       Appearance: Normal appearance.   HENT:      Head: Normocephalic and atraumatic.      Right Ear: Tympanic membrane and ear canal normal.      Left Ear: Tympanic membrane and ear canal normal.      Nose: Nose normal.      Mouth/Throat:      Pharynx: Oropharynx is clear.   Eyes:      Extraocular Movements: Extraocular movements intact.      Conjunctiva/sclera: Conjunctivae normal.      Pupils: Pupils are equal, round, and reactive to light.   Cardiovascular:      Rate and Rhythm: Normal rate and regular rhythm.      Heart sounds: Normal heart sounds.   Pulmonary:      Effort: Pulmonary effort is normal.      Breath sounds: Normal breath sounds.   Abdominal:      General: Abdomen is flat. Bowel sounds are normal. There is no distension.      Palpations: Abdomen is soft.      Tenderness: There is no abdominal tenderness. There is no guarding or rebound. "   Musculoskeletal:         General: Normal range of motion.      Cervical back: Normal range of motion and neck supple.   Skin:     General: Skin is warm and dry.   Neurological:      General: No focal deficit present.      Mental Status: She is alert and oriented to person, place, and time. Mental status is at baseline.   Psychiatric:         Mood and Affect: Mood normal.         Behavior: Behavior normal.         Thought Content: Thought content normal.         Judgment: Judgment normal.        Result Review :      Data reviewed: reviewed prevous blood work.             Assessment and Plan   Diagnoses and all orders for this visit:    1. Type 2 diabetes mellitus with hyperglycemia, without long-term current use of insulin (Primary)  Assessment & Plan:  Diabetes is worsening.   Continue current treatment regimen.  Reminded to bring in blood sugar diary at next visit.  Dietary recommendations for ADA diet.  Regular aerobic exercise.  Discussed ways to avoid symptomatic hypoglycemia.  Discussed foot care.  Diabetes will be reassessed in 1 month. She was able to demonstrate how to properly do a fsbs. We discussed weight management and diet and she was given pt. Education materials.    Orders:  -     metFORMIN (Glucophage) 500 MG tablet; Take 1 tablet by mouth 2 (Two) Times a Day With Meals.  Dispense: 180 tablet; Refill: 1    2. Fungal dermatosis  Assessment & Plan:  Prescribed a cream for this and I will see her back in one month.    Orders:  -     clotrimazole-betamethasone (LOTRISONE) 1-0.05 % cream; Apply 1 application topically to the appropriate area as directed 2 (Two) Times a Day.  Dispense: 120 g; Refill: 0         I spent 40 minutes caring for Maria Alejanrda on this date of service. This time includes time spent by me in the following activities:reviewing tests, performing a medically appropriate examination and/or evaluation , counseling and educating the patient/family/caregiver, ordering medications, tests, or  procedures and documenting information in the medical record  Follow Up   Return in about 4 weeks (around 6/19/2023) for Recheck.  Patient was given instructions and counseling regarding her condition or for health maintenance advice. Please see specific information pulled into the AVS if appropriate.       Answers for HPI/ROS submitted by the patient on 5/22/2023  Please describe your symptoms.: Blood work with high sugar and ashley A1C as well as a rash I cannot get rid of  Have you had these symptoms before?: No  How long have you been having these symptoms?: Greater than 2 weeks  What is the primary reason for your visit?: Other

## 2023-05-23 RX ORDER — LANCETS 33 GAUGE
1 EACH MISCELLANEOUS AS NEEDED
Qty: 50 EACH | Refills: 1 | Status: SHIPPED | OUTPATIENT
Start: 2023-05-23

## 2023-05-23 RX ORDER — BLOOD-GLUCOSE METER
1 KIT MISCELLANEOUS AS NEEDED
Qty: 1 EACH | Refills: 0 | Status: SHIPPED | OUTPATIENT
Start: 2023-05-23

## 2023-05-23 NOTE — TELEPHONE ENCOUNTER
Rx Refill Note    Requested Prescriptions     Pending Prescriptions Disp Refills   • glucose monitor monitoring kit 1 each 0     Si each As Needed (hyperglycemia).   • glucose blood test strip 50 each 1     Sig: Use as instructed   • Lancets 33G misc 50 each 1     Si each As Needed (hyperglycemia).        Last office visit with prescribing clinician: 2023      Next office visit with prescribing clinician: 2023   Last labs:   Last refill: needs   Pharmacy (be sure to add in Epic). correct

## 2023-05-24 ENCOUNTER — TELEPHONE (OUTPATIENT)
Dept: FAMILY MEDICINE CLINIC | Facility: CLINIC | Age: 23
End: 2023-05-24
Payer: COMMERCIAL

## 2023-05-24 NOTE — TELEPHONE ENCOUNTER
Caller: Maria Alejandra Appiah    Relationship: Self    Best call back number: [phone]    What is the best time to reach you: ANY    Who are you requesting to speak with (clinical staff, provider,  specific staff member): CLINICAL    What was the call regarding: THE PATIENT STATES THAT [id number] [place] [facility] [address]  [address] - [phone]  - [phone] FX IS REQUIRING INSTRUCTIONS FOR THE DIABETIC SUPPLIES BEFORE THEY WILL FILL THE PRESCRIPTIONS FOR THE LANCETS AND TEST STRIPS.     Do you require a callback: IF NEEDED.

## 2023-05-26 ENCOUNTER — TELEPHONE (OUTPATIENT)
Dept: FAMILY MEDICINE CLINIC | Facility: CLINIC | Age: 23
End: 2023-05-26

## 2023-05-26 NOTE — TELEPHONE ENCOUNTER
Provider: IRA     Caller: NANCY PORTILLO    Phone Number: [phone]    Reason for Call:RAJEEV WAS TOLD TO CHECK WITH INSURANCE ABOUT GETTING DEXCOM FOR DIABETES.  PATIENT STATED THAT SHE SPOKE WITH INSURANCE AND THEY STATED THEY WILL APPROVE IT

## 2023-05-26 NOTE — TELEPHONE ENCOUNTER
Caller: Bijal Maria Alejandra    Relationship: Self    Best call back number: [phone]    What is the best time to reach you: ANY    Who are you requesting to speak with (clinical staff, provider,  specific staff member): CLINICAL    What was the call regarding: THE PATIENT CALLING TO STATE THAT [facility], [facility], AND DIABETIC SUPPLIES IN [place] ARE ALL COVERED THROUGH HER INSURANCE FOR THE DEXCOM.     Do you require a callback: YES, PLEASE.    THANK YOU.

## 2023-05-30 RX ORDER — PROCHLORPERAZINE 25 MG/1
SUPPOSITORY RECTAL
Qty: 12 EACH | Refills: 3 | Status: SHIPPED | OUTPATIENT
Start: 2023-05-30

## 2023-05-30 RX ORDER — PROCHLORPERAZINE 25 MG/1
1 SUPPOSITORY RECTAL
Qty: 3 EACH | Refills: 4 | Status: SHIPPED | OUTPATIENT
Start: 2023-05-30

## 2023-06-12 NOTE — TELEPHONE ENCOUNTER
Caller: Maria Alejandra Appiah    Relationship: Self    Best call back number: [phone]    Requested Prescriptions:   Requested Prescriptions     Pending Prescriptions Disp Refills   • Continuous Blood Gluc Sensor (Dexcom G6 Sensor) 12 each 3     Sig: Every 10 (Ten) Days.        Pharmacy where request should be sent: [address] [facility] [phone]  - [phone] FX     Last office visit with prescribing clinician: 5/22/2023   Last telemedicine visit with prescribing clinician: 2/15/2023   Next office visit with prescribing clinician: 6/22/2023     Additional details provided by patient: MARIA ALEJANDRA SAID SHE RAN INTO THE DOOR IN THE NIGHT AND THE SENSOR FELL OUT AND WILL NEED REFILLS.  HER CURRENT ONE WILL LAST TO THE 062023    Does the patient have less than a 3 day supply:  [] Yes  [] No    Would you like a call back once the refill request has been completed: [x] Yes [] No    If the office needs to give you a call back, can they leave a voicemail: [] Yes [] No    Krystal Iyer Rep   06/12/23 13:32 EDT

## 2023-06-15 RX ORDER — PROCHLORPERAZINE 25 MG/1
SUPPOSITORY RECTAL
Qty: 12 EACH | Refills: 3 | Status: SHIPPED | OUTPATIENT
Start: 2023-06-15

## 2023-08-28 ENCOUNTER — TELEPHONE (OUTPATIENT)
Dept: OBSTETRICS AND GYNECOLOGY | Facility: CLINIC | Age: 23
End: 2023-08-28
Payer: COMMERCIAL

## 2023-08-28 NOTE — TELEPHONE ENCOUNTER
FRANCOISE patient.     Notified we do not need to keep annual appointment. If patient is due for pap it can be done at NOB appointment. Patient v/u. Will cancel annual.

## 2023-08-28 NOTE — TELEPHONE ENCOUNTER
Hub staff attempted to follow warm transfer process and was unsuccessful     Caller: Maria Alejandra Appiah    Relationship to patient: Self    Best call back number: [phone]    Patient is needing: PATIENT CALLED TO MAKE NEW OB APPT. PT IS SCHEDULED ON 9/21/23. PT IS SCHEDULED FOR ANNUAL ON 9/18/23. DOES SHE STILL NEED TO COME IN FOR ANNUAL EXAM?

## 2023-09-15 ENCOUNTER — TELEPHONE (OUTPATIENT)
Dept: FAMILY MEDICINE CLINIC | Facility: CLINIC | Age: 23
End: 2023-09-15

## 2023-09-15 NOTE — TELEPHONE ENCOUNTER
Caller: Maria Alejandra Appiah    Relationship: Self    Best call back number: [phone]     What is the best time to reach you: ASAP    Who are you requesting to speak with (clinical staff, provider,  specific staff member): CLINICAL       What was the call regarding: PATIENT WAS PRESCRIBED OXYCODONE 09/05/23 BY HER OBGYN DUE TO HAVING A MISCARRIAGE.    SHE NOW FEELS CONSTIPATED, SHE HAS TAKEN MIRALAX, USED SUPPOSITORIES, STOOL SOFTENERS AND LAXATIVES AND SHE STILL HASN'T HAD A BOWEL MOVEMENT     SHE IS IN SEVERE PAIN AND NEEDS SUGGESTIONS AND RECOMMENDATION ON WHAT TO DO AND/OR TAKE.       ALSO PATIENT HAS HAD AN UTI OFF AND ON FOR A MONTH OBYN HAS BEEN PRESCRIPTION MEDICATION BUT SHE THINKS SHE MAY HAVE IT AGAIN OR IT HASN'T WENT AWAY. PATIENT IS REQUESTING MEDICATION FOR BOTH CONCERNS PLEASE ADVISE AND CALL PATIENT     [id number] - [place], [place] - [address] - [phone]  - [phone] FX

## 2023-09-22 ENCOUNTER — OFFICE VISIT (OUTPATIENT)
Dept: FAMILY MEDICINE CLINIC | Facility: CLINIC | Age: 23
End: 2023-09-22
Payer: COMMERCIAL

## 2023-09-22 VITALS
HEIGHT: 68 IN | OXYGEN SATURATION: 98 % | WEIGHT: 293 LBS | SYSTOLIC BLOOD PRESSURE: 112 MMHG | DIASTOLIC BLOOD PRESSURE: 86 MMHG | TEMPERATURE: 98.4 F | HEART RATE: 86 BPM | BODY MASS INDEX: 44.41 KG/M2

## 2023-09-22 DIAGNOSIS — E11.65 TYPE 2 DIABETES MELLITUS WITH HYPERGLYCEMIA, WITHOUT LONG-TERM CURRENT USE OF INSULIN: ICD-10-CM

## 2023-09-22 DIAGNOSIS — B36.9 FUNGAL DERMATOSIS: Primary | ICD-10-CM

## 2023-09-22 PROCEDURE — 99213 OFFICE O/P EST LOW 20 MIN: CPT | Performed by: NURSE PRACTITIONER

## 2023-09-22 RX ORDER — HYDROXYZINE HYDROCHLORIDE 25 MG/1
25 TABLET, FILM COATED ORAL 3 TIMES DAILY PRN
COMMUNITY

## 2023-09-22 RX ORDER — NORETHINDRONE ACETATE AND ETHINYL ESTRADIOL 1MG-20(21)
1 KIT ORAL DAILY
COMMUNITY

## 2023-09-22 RX ORDER — CITALOPRAM 40 MG/1
40 TABLET ORAL DAILY
COMMUNITY

## 2023-09-22 RX ORDER — CEFDINIR 300 MG/1
300 CAPSULE ORAL EVERY 12 HOURS
COMMUNITY
Start: 2023-09-15 | End: 2023-09-22

## 2023-09-23 LAB
ALBUMIN SERPL-MCNC: 4.4 G/DL (ref 4–5)
ALBUMIN/GLOB SERPL: 1.4 {RATIO} (ref 1.2–2.2)
ALP SERPL-CCNC: 106 IU/L (ref 44–121)
ALT SERPL-CCNC: 22 IU/L (ref 0–32)
ANA SER QL: NEGATIVE
AST SERPL-CCNC: 23 IU/L (ref 0–40)
BILIRUB SERPL-MCNC: 0.5 MG/DL (ref 0–1.2)
BUN SERPL-MCNC: 6 MG/DL (ref 6–20)
BUN/CREAT SERPL: 10 (ref 9–23)
CALCIUM SERPL-MCNC: 9.4 MG/DL (ref 8.7–10.2)
CHLORIDE SERPL-SCNC: 100 MMOL/L (ref 96–106)
CO2 SERPL-SCNC: 24 MMOL/L (ref 20–29)
CREAT SERPL-MCNC: 0.62 MG/DL (ref 0.57–1)
CRP SERPL-MCNC: 31 MG/L (ref 0–10)
EGFRCR SERPLBLD CKD-EPI 2021: 129 ML/MIN/1.73
GLOBULIN SER CALC-MCNC: 3.2 G/DL (ref 1.5–4.5)
GLUCOSE SERPL-MCNC: 89 MG/DL (ref 70–99)
HBA1C MFR BLD: 5.9 % (ref 4.8–5.6)
POTASSIUM SERPL-SCNC: 4.2 MMOL/L (ref 3.5–5.2)
PROT SERPL-MCNC: 7.6 G/DL (ref 6–8.5)
RHEUMATOID FACT SERPL-ACNC: <10 IU/ML
SODIUM SERPL-SCNC: 139 MMOL/L (ref 134–144)

## 2023-11-16 ENCOUNTER — TELEMEDICINE (OUTPATIENT)
Dept: FAMILY MEDICINE CLINIC | Facility: TELEHEALTH | Age: 23
End: 2023-11-16
Payer: COMMERCIAL

## 2023-11-16 DIAGNOSIS — H66.92 LEFT OTITIS MEDIA, UNSPECIFIED OTITIS MEDIA TYPE: Primary | ICD-10-CM

## 2023-11-16 PROBLEM — B36.9 FUNGAL DERMATOSIS: Status: RESOLVED | Noted: 2023-05-22 | Resolved: 2023-11-16

## 2023-11-16 PROBLEM — N93.8 DUB (DYSFUNCTIONAL UTERINE BLEEDING): Status: RESOLVED | Noted: 2023-05-17 | Resolved: 2023-11-16

## 2023-11-16 PROBLEM — N92.0 MENORRHAGIA WITH REGULAR CYCLE: Status: RESOLVED | Noted: 2023-05-10 | Resolved: 2023-11-16

## 2023-11-16 PROBLEM — R05.1 ACUTE COUGH: Status: RESOLVED | Noted: 2023-01-05 | Resolved: 2023-11-16

## 2023-11-16 RX ORDER — AMOXICILLIN 875 MG/1
875 TABLET, COATED ORAL 2 TIMES DAILY
Qty: 20 TABLET | Refills: 0 | Status: SHIPPED | OUTPATIENT
Start: 2023-11-16 | End: 2023-11-26

## 2023-11-16 NOTE — PROGRESS NOTES
You have chosen to receive care through a telehealth visit.  Do you consent to use a video/audio connection for your medical care today? Yes     CHIEF COMPLAINT  Chief Complaint   Patient presents with    Earache         HPI  Maria Alejandra Appiah is a 22 y.o. female  presents with complaint of left ear pain that started 2 days ago.  She states that she has a history of ear infections.    Review of Systems   HENT:  Positive for ear pain (left).    All other systems reviewed and are negative.      Past Medical History:   Diagnosis Date    Anxiety     Insomnia        Family History   Problem Relation Age of Onset    Hypertension Paternal Uncle        Social History     Socioeconomic History    Marital status: Single    Number of children: 0   Tobacco Use    Smoking status: Never     Passive exposure: Never    Smokeless tobacco: Never   Vaping Use    Vaping Use: Never used   Substance and Sexual Activity    Alcohol use: Not Currently    Drug use: Never    Sexual activity: Yes     Partners: Male     Birth control/protection: OCP       Maria Alejandra Appiah  reports that she has never smoked. She has never been exposed to tobacco smoke. She has never used smokeless tobacco.       There were no vitals taken for this visit.    PHYSICAL EXAM  Physical Exam   Constitutional: She appears well-developed and well-nourished.   HENT:   Head: Normocephalic.   Eyes: Pupils are equal, round, and reactive to light.   Pulmonary/Chest: Effort normal.   Musculoskeletal: Normal range of motion.   Neurological: She is alert.   Psychiatric: She has a normal mood and affect.       Results for orders placed or performed in visit on 09/22/23   Hemoglobin A1c    Specimen: Arm, Left; Blood   Result Value Ref Range    Hemoglobin A1C 5.9 (H) 4.8 - 5.6 %   LUCILLE    Specimen: Arm, Left; Blood   Result Value Ref Range    LUCILLE Direct Negative Negative   C-reactive protein    Specimen: Arm, Left; Blood   Result Value Ref Range    C-Reactive Protein 31 (H) 0 - 10  mg/L   Rheumatoid Factor    Specimen: Arm, Left; Blood   Result Value Ref Range    RA Latex Turbid <10.0 <14.0 IU/mL   Comprehensive metabolic panel    Specimen: Arm, Left; Blood   Result Value Ref Range    Glucose 89 70 - 99 mg/dL    BUN 6 6 - 20 mg/dL    Creatinine 0.62 0.57 - 1.00 mg/dL    EGFR Result 129 >59 mL/min/1.73    BUN/Creatinine Ratio 10 9 - 23    Sodium 139 134 - 144 mmol/L    Potassium 4.2 3.5 - 5.2 mmol/L    Chloride 100 96 - 106 mmol/L    Total CO2 24 20 - 29 mmol/L    Calcium 9.4 8.7 - 10.2 mg/dL    Total Protein 7.6 6.0 - 8.5 g/dL    Albumin 4.4 4.0 - 5.0 g/dL    Globulin 3.2 1.5 - 4.5 g/dL    A/G Ratio 1.4 1.2 - 2.2    Total Bilirubin 0.5 0.0 - 1.2 mg/dL    Alkaline Phosphatase 106 44 - 121 IU/L    AST (SGOT) 23 0 - 40 IU/L    ALT (SGPT) 22 0 - 32 IU/L       Diagnoses and all orders for this visit:    1. Left otitis media, unspecified otitis media type (Primary)  -     amoxicillin (AMOXIL) 875 MG tablet; Take 1 tablet by mouth 2 (Two) Times a Day for 10 days.  Dispense: 20 tablet; Refill: 0          FOLLOW-UP  As discussed during visit with PCP/[facility] if no improvement or Urgent Care/Emergency Department if worsening of symptoms    Patient verbalizes understanding of medication dosage, comfort measures, instructions for treatment and follow-up.    Nicole Arnold, APRN  11/16/2023  06:15 EST    The use of a video visit has been reviewed with the patient and verbal informed consent has been obtained. Myself and Maria Alejandra Appiah participated in this visit. The patient is located in [address] [place] [address].    I am located in [place], [place]. Mychart and Twilio were utilized. I spent 10 minutes in the patient's chart for this visit.

## 2024-02-16 ENCOUNTER — OFFICE VISIT (OUTPATIENT)
Dept: FAMILY MEDICINE CLINIC | Facility: CLINIC | Age: 24
End: 2024-02-16
Payer: COMMERCIAL

## 2024-02-16 VITALS
SYSTOLIC BLOOD PRESSURE: 122 MMHG | DIASTOLIC BLOOD PRESSURE: 68 MMHG | BODY MASS INDEX: 44.41 KG/M2 | HEART RATE: 85 BPM | WEIGHT: 293 LBS | OXYGEN SATURATION: 98 % | HEIGHT: 68 IN

## 2024-02-16 DIAGNOSIS — N30.00 ACUTE CYSTITIS WITHOUT HEMATURIA: Primary | ICD-10-CM

## 2024-02-16 DIAGNOSIS — R30.0 DYSURIA: ICD-10-CM

## 2024-02-16 LAB
BILIRUB BLD-MCNC: NEGATIVE MG/DL
CLARITY, POC: ABNORMAL
COLOR UR: YELLOW
EXPIRATION DATE: ABNORMAL
GLUCOSE UR STRIP-MCNC: NEGATIVE MG/DL
KETONES UR QL: NEGATIVE
LEUKOCYTE EST, POC: ABNORMAL
Lab: ABNORMAL
NITRITE UR-MCNC: NEGATIVE MG/ML
PH UR: 6.5 [PH] (ref 5–8)
PROT UR STRIP-MCNC: ABNORMAL MG/DL
RBC # UR STRIP: NEGATIVE /UL
SP GR UR: 1.03 (ref 1–1.03)
UROBILINOGEN UR QL: NORMAL

## 2024-02-16 PROCEDURE — 99213 OFFICE O/P EST LOW 20 MIN: CPT | Performed by: STUDENT IN AN ORGANIZED HEALTH CARE EDUCATION/TRAINING PROGRAM

## 2024-02-16 PROCEDURE — 81003 URINALYSIS AUTO W/O SCOPE: CPT | Performed by: STUDENT IN AN ORGANIZED HEALTH CARE EDUCATION/TRAINING PROGRAM

## 2024-02-16 RX ORDER — PRENATAL VIT NO.126/IRON/FOLIC 28MG-0.8MG
1 TABLET ORAL DAILY
COMMUNITY

## 2024-02-16 RX ORDER — DIPHENHYDRAMINE HYDROCHLORIDE 25 MG/1
25 CAPSULE ORAL 3 TIMES DAILY
COMMUNITY

## 2024-02-16 RX ORDER — CEPHALEXIN 500 MG/1
500 CAPSULE ORAL 4 TIMES DAILY
Qty: 28 CAPSULE | Refills: 0 | Status: SHIPPED | OUTPATIENT
Start: 2024-02-16 | End: 2024-02-23

## 2024-02-18 LAB
BACTERIA UR CULT: NORMAL
BACTERIA UR CULT: NORMAL

## 2024-03-08 ENCOUNTER — TELEPHONE (OUTPATIENT)
Dept: FAMILY MEDICINE CLINIC | Facility: CLINIC | Age: 24
End: 2024-03-08
Payer: COMMERCIAL

## 2024-03-08 NOTE — TELEPHONE ENCOUNTER
HUB TO RELAY    PLEASE LET PATIENT KNOW HER APPOINTMENT ON 3/22 WITH DR. BANEGAS IS NEEDING TO BE RESCHEDULED DUE TO DR. BANEGAS BEING OUT OF OFFICE.     ATTEMPTED TO LEAVE 2 VOICEMAILS MAKING PATIENT AWARE.

## 2024-04-04 ENCOUNTER — TRANSCRIBE ORDERS (OUTPATIENT)
Dept: OBSTETRICS AND GYNECOLOGY | Facility: HOSPITAL | Age: 24
End: 2024-04-04
Payer: COMMERCIAL

## 2024-04-04 DIAGNOSIS — O28.5 ABNORMAL GENETIC TEST DURING PREGNANCY: Primary | ICD-10-CM

## 2024-04-04 DIAGNOSIS — O99.210 OBESITY IN PREGNANCY, ANTEPARTUM: ICD-10-CM

## 2024-04-04 DIAGNOSIS — Z34.90 PREGNANCY, UNSPECIFIED GESTATIONAL AGE: ICD-10-CM

## 2024-04-04 DIAGNOSIS — O24.119 TYPE 2 DIABETES MELLITUS COMPLICATING PREGNANCY, ANTEPARTUM: ICD-10-CM

## 2024-04-08 ENCOUNTER — LAB (OUTPATIENT)
Dept: LAB | Facility: HOSPITAL | Age: 24
End: 2024-04-08
Payer: COMMERCIAL

## 2024-04-08 ENCOUNTER — OFFICE VISIT (OUTPATIENT)
Dept: OBSTETRICS AND GYNECOLOGY | Facility: HOSPITAL | Age: 24
End: 2024-04-08
Payer: COMMERCIAL

## 2024-04-08 ENCOUNTER — HOSPITAL ENCOUNTER (OUTPATIENT)
Dept: WOMENS IMAGING | Facility: HOSPITAL | Age: 24
Discharge: HOME | End: 2024-04-08
Payer: COMMERCIAL

## 2024-04-08 VITALS — DIASTOLIC BLOOD PRESSURE: 72 MMHG | BODY MASS INDEX: 49.73 KG/M2 | SYSTOLIC BLOOD PRESSURE: 142 MMHG | WEIGHT: 293 LBS

## 2024-04-08 DIAGNOSIS — E11.65 TYPE 2 DIABETES MELLITUS WITH HYPERGLYCEMIA, WITHOUT LONG-TERM CURRENT USE OF INSULIN: ICD-10-CM

## 2024-04-08 DIAGNOSIS — E11.65 TYPE 2 DIABETES MELLITUS WITH HYPERGLYCEMIA, WITHOUT LONG-TERM CURRENT USE OF INSULIN: Primary | ICD-10-CM

## 2024-04-08 DIAGNOSIS — O28.5 ABNORMAL GENETIC TEST DURING PREGNANCY: ICD-10-CM

## 2024-04-08 DIAGNOSIS — O24.119 TYPE 2 DIABETES MELLITUS COMPLICATING PREGNANCY, ANTEPARTUM: ICD-10-CM

## 2024-04-08 DIAGNOSIS — O99.210 OBESITY IN PREGNANCY, ANTEPARTUM: ICD-10-CM

## 2024-04-08 DIAGNOSIS — E66.01 CLASS 3 SEVERE OBESITY WITH SERIOUS COMORBIDITY AND BODY MASS INDEX (BMI) OF 45.0 TO 49.9 IN ADULT, UNSPECIFIED OBESITY TYPE: ICD-10-CM

## 2024-04-08 DIAGNOSIS — Z34.90 PREGNANCY, UNSPECIFIED GESTATIONAL AGE: ICD-10-CM

## 2024-04-08 PROBLEM — E66.9 OBESITY: Status: ACTIVE | Noted: 2023-05-17

## 2024-04-08 PROCEDURE — 76815 OB US LIMITED FETUS(S): CPT

## 2024-04-08 PROCEDURE — 36415 COLL VENOUS BLD VENIPUNCTURE: CPT

## 2024-04-08 PROCEDURE — 99204 OFFICE O/P NEW MOD 45 MIN: CPT | Performed by: OBSTETRICS & GYNECOLOGY

## 2024-04-08 NOTE — LETTER
"2024     Rosario Botello DO  [address]  [address]  [facility] [address]    Patient: Maria Alejandra Appiah   YOB: 2000   Date of Visit: 2024       Dear Rosario Botello DO,    Thank you for referring Maria Alejandra Appiah to me for evaluation. Below is a copy of my consult note.    If you have questions, please do not hesitate to call me. I look forward to following Maria Alejandra along with you.         Sincerely,        Stacy Burdick MD        CC: No Recipients                      Maternal/Fetal Medicine Consult Note     Name: Maria Alejandra Appiah    : 2000     MRN: [id number]     Referring Provider: Rosario Botello DO    Chief Complaint  NIPT insufficient fetal DNA x 2    Subjective     History of Present Illness:  Maria Alejandra Appiah is a 23 y.o.  15w6d who presents today for a limited ultrasound for viability  and early anatomy and to discuss low fetal fraction on NIPT x 2.     She denies LOF/VB/cramping.     ALVARO: Estimated Date of Delivery: 24     ROS:   As noted in HPI.     Past Medical History:   Diagnosis Date   • Anxiety    • Depression    • Diabetes mellitus     started on metformin by PCP   • Insomnia    • PCOS (polycystic ovarian syndrome)     Patient states it has been mentioned on several visits      Past Surgical History:   Procedure Laterality Date   • ADENOIDECTOMY     • COLONOSCOPY     • LAPAROSCOPIC CHOLECYSTECTOMY     • TONSILLECTOMY      2023   • WISDOM TOOTH EXTRACTION        OB History          2    Para   0    Term   0       0    AB   1    Living   0         SAB   1    IAB   0    Ectopic   0    Molar   0    Multiple   0    Live Births   0          Obstetric Comments   Fob#1 pregnancy #1&2               Objective     Vital Signs  /72   Wt (!) 148 kg (327 lb)   LMP 2023   Estimated body mass index is 49.73 kg/m² as calculated from the following:    Height as of 24: 172.7 cm (67.99\").    Weight as of this " encounter: 148 kg (327 lb).    Physical Exam  Constitutional:       Appearance: Normal appearance. She is obese.   HENT:      Head: Normocephalic and atraumatic.   Pulmonary:      Effort: Pulmonary effort is normal.   Musculoskeletal:         General: Normal range of motion.   Neurological:      General: No focal deficit present.      Mental Status: She is alert and oriented to person, place, and time.   Psychiatric:         Mood and Affect: Mood normal.         Behavior: Behavior normal.         Thought Content: Thought content normal.         Judgment: Judgment normal.       Ultrasound Impression:   Viable SIUP, S=D, nl MARKEL, anterior placenta, nl CL.     Assessment and Plan     Diagnoses and all orders for this visit:    1. Type 2 diabetes mellitus with hyperglycemia, without long-term current use of insulin (Primary)  Assessment & Plan:  Patient is not currently able to check her BS as she does not have a CGM or glucometer. She has been waiting on the pharmacy to process. Recommended strongly that patient either self-pay for a CGM or buy a glucometer over the counter and start logging her blood sugar. Patient states that she is taking a daily ASA 81mgs.    We reviewed the goals for blood glucose in pregnancy, including fasting blood sugars < 90-95mg/dl and 2-hr post-prandial values < 110-120mg/dl.     We reviewed the complications associated with poorly controlled diabetes in pregnancy including increased risk for miscarriage, congenital abnormalities, macrosomia, need for operative delivery, damage to maternal or fetal structures at delivery, stillbirth, and childhood obesity and/or early development of Type 2 DM.    I offered to review her blood glucose logs weekly which is suggested in pregnancy and patient stated that the review could be done by Dr. Botello or BALDEMAR at [facility].      - Follow-up scheduled her in 4-5wks for an anatomic survey --> If patient is getting this done with BALDEMAR at [facility], please cancel this  appointment as will be a duplicate   - Recommend patient have q 4 wk ultrasounds for growth after the 20wk anatomy survey   - Recommend patient have a fetal echo at 24wks   - Recommend patient start twice weekly testing at 32wks of pregnancy   - If patient does not already have baseline 24hr urine and preeclampsia labs, recommend these be collected   - Recommend patient have a baseline eye exam in pregnancy   - Very happy to help with diabetes management during pregnancy including weekly review of blood sugar, recommendations for medication changes, and recommendation for medication management surrounding delivery -- just let us know if this is needed so we can add her to our roster of DM patients that we follow closely    Orders:  -     Cancel: Maternal Screen 4; Future  -     Maternal Screen 4; Future  -     [facility]; Future  -     metFORMIN ER (GLUCOPHAGE-XR) 500 MG 24 hr tablet; Take 1 tablet by mouth 2 (Two) Times a Day With Meals.  Dispense: 60 tablet; Refill: 6    2. Class 3 severe obesity with serious comorbidity and body mass index (BMI) of 45.0 to 49.9 in adult, unspecified obesity type  -     [facility]; Future  -     metFORMIN ER (GLUCOPHAGE-XR) 500 MG 24 hr tablet; Take 1 tablet by mouth 2 (Two) Times a Day With Meals.  Dispense: 60 tablet; Refill: 6         Follow Up  Return in about 5 weeks (around 2024).    I spent 30 minutes caring for the patient on the day of service. This included: obtaining or reviewing a separately obtained medical history, reviewing patient records, performing a medically appropriate exam and/or evaluation, counseling or educating the patient/family/caregiver, ordering medications, labs, and/or procedures and documenting such in the medical record. This does not include time spent on review and interpretation of other tests such as fetal ultrasound or the performance of other procedures such as amniocentesis or  [facility].      Stacy Burdick MD  04/08/2024

## 2024-04-08 NOTE — PROGRESS NOTES
Patient has an appointment with Dr. Botello on 4/19/24. NIPT insufficient DNA x2. Patient denies abdominal pain and vaginal bleeding. Diagnosed with T2DM in 2023 by PCP, started on Metformin. [facility] (including one-touch) for 2 weeks and Dexcom for 1 week. Patient reports Hemoglobin A1C 5.6 last week at [facility] office (next appointment there is 5/7/24.)

## 2024-04-09 RX ORDER — METFORMIN HYDROCHLORIDE 500 MG/1
500 TABLET, EXTENDED RELEASE ORAL 2 TIMES DAILY WITH MEALS
Qty: 60 TABLET | Refills: 6 | Status: SHIPPED | OUTPATIENT
Start: 2024-04-09

## 2024-04-09 NOTE — PROGRESS NOTES
"    Maternal/Fetal Medicine Consult Note     Name: Maria Alejandra Appiah    : 2000     MRN: [id number]     Referring Provider: Rosario Botello DO    Chief Complaint  NIPT insufficient fetal DNA x 2    Subjective     History of Present Illness:  Maria Alejandra Appiah is a 23 y.o.  15w6d who presents today for a limited ultrasound for viability  and early anatomy and to discuss low fetal fraction on NIPT x 2.     She denies LOF/VB/cramping.     ALVARO: Estimated Date of Delivery: 24     ROS:   As noted in HPI.     Past Medical History:   Diagnosis Date    Anxiety     Depression     Diabetes mellitus     started on metformin by PCP    Insomnia     PCOS (polycystic ovarian syndrome)     Patient states it has been mentioned on several visits      Past Surgical History:   Procedure Laterality Date    ADENOIDECTOMY      COLONOSCOPY      LAPAROSCOPIC CHOLECYSTECTOMY      TONSILLECTOMY      2023    WISDOM TOOTH EXTRACTION        OB History          2    Para   0    Term   0       0    AB   1    Living   0         SAB   1    IAB   0    Ectopic   0    Molar   0    Multiple   0    Live Births   0          Obstetric Comments   Fob#1 pregnancy #1&2               Objective     Vital Signs  /72   Wt (!) 148 kg (327 lb)   LMP 2023   Estimated body mass index is 49.73 kg/m² as calculated from the following:    Height as of 24: 172.7 cm (67.99\").    Weight as of this encounter: 148 kg (327 lb).    Physical Exam  Constitutional:       Appearance: Normal appearance. She is obese.   HENT:      Head: Normocephalic and atraumatic.   Pulmonary:      Effort: Pulmonary effort is normal.   Musculoskeletal:         General: Normal range of motion.   Neurological:      General: No focal deficit present.      Mental Status: She is alert and oriented to person, place, and time.   Psychiatric:         Mood and Affect: Mood normal.         Behavior: Behavior normal.         Thought Content: Thought " content normal.         Judgment: Judgment normal.       Ultrasound Impression:   Viable SIUP, S=D, nl MARKEL, anterior placenta, nl CL.     Assessment and Plan     Diagnoses and all orders for this visit:    1. Type 2 diabetes mellitus with hyperglycemia, without long-term current use of insulin (Primary)  Assessment & Plan:  Patient is not currently able to check her BS as she does not have a CGM or glucometer. She has been waiting on the pharmacy to process. Recommended strongly that patient either self-pay for a CGM or buy a glucometer over the counter and start logging her blood sugar. Patient states that she is taking a daily ASA 81mgs.    We reviewed the goals for blood glucose in pregnancy, including fasting blood sugars < 90-95mg/dl and 2-hr post-prandial values < 110-120mg/dl.     We reviewed the complications associated with poorly controlled diabetes in pregnancy including increased risk for miscarriage, congenital abnormalities, macrosomia, need for operative delivery, damage to maternal or fetal structures at delivery, stillbirth, and childhood obesity and/or early development of Type 2 DM.    I offered to review her blood glucose logs weekly which is suggested in pregnancy and patient stated that the review could be done by Dr. Botello or BALDEMAR at [facility] [place] [facility].      - Follow-up scheduled her in 4-5wks for an anatomic survey --> If patient is getting this done with BALDEMAR at [facility], please cancel this appointment as will be a duplicate   - Recommend patient have q 4 wk ultrasounds for growth after the 20wk anatomy survey   - Recommend patient have a fetal echo at 24wks   - Recommend patient start twice weekly testing at 32wks of pregnancy   - If patient does not already have baseline 24hr urine and preeclampsia labs, recommend these be collected   - Recommend patient have a baseline eye exam in pregnancy   - Very happy to help with diabetes management during pregnancy including weekly review of blood sugar, recommendations  for medication changes, and recommendation for medication management surrounding delivery -- just let us know if this is needed so we can add her to our roster of DM patients that we follow closely    Orders:  -     Cancel: Maternal Screen 4; Future  -     Maternal Screen 4; Future  -      [facility]; Future  -     metFORMIN ER (GLUCOPHAGE-XR) 500 MG 24 hr tablet; Take 1 tablet by mouth 2 (Two) Times a Day With Meals.  Dispense: 60 tablet; Refill: 6    2. Class 3 severe obesity with serious comorbidity and body mass index (BMI) of 45.0 to 49.9 in adult, unspecified obesity type  Assessment & Plan:  Patient with low fetal fraction x 2 likely secondary to maternal weight. Reviewed other genetic screening options and genetic diagnostic options. Patient opts for a Quad screen today.      - Quad screen sent     Orders:  -      [facility]; Future  -     metFORMIN ER (GLUCOPHAGE-XR) 500 MG 24 hr tablet; Take 1 tablet by mouth 2 (Two) Times a Day With Meals.  Dispense: 60 tablet; Refill: 6         Follow Up  Return in about 5 weeks (around 2024).    I spent 30 minutes caring for the patient on the day of service. This included: obtaining or reviewing a separately obtained medical history, reviewing patient records, performing a medically appropriate exam and/or evaluation, counseling or educating the patient/family/caregiver, ordering medications, labs, and/or procedures and documenting such in the medical record. This does not include time spent on review and interpretation of other tests such as fetal ultrasound or the performance of other procedures such as amniocentesis or CV[facility].      Stacy Burdick MD  2024

## 2024-04-09 NOTE — ASSESSMENT & PLAN NOTE
Patient is not currently able to check her BS as she does not have a CGM or glucometer. She has been waiting on the pharmacy to process. Recommended strongly that patient either self-pay for a CGM or buy a glucometer over the counter and start logging her blood sugar. Patient states that she is taking a daily ASA 81mgs.    We reviewed the goals for blood glucose in pregnancy, including fasting blood sugars < 90-95mg/dl and 2-hr post-prandial values < 110-120mg/dl.     We reviewed the complications associated with poorly controlled diabetes in pregnancy including increased risk for miscarriage, congenital abnormalities, macrosomia, need for operative delivery, damage to maternal or fetal structures at delivery, stillbirth, and childhood obesity and/or early development of Type 2 DM.    I offered to review her blood glucose logs weekly which is suggested in pregnancy and patient stated that the review could be done by Dr. Botello or BALDEMAR at [facility].      - Follow-up scheduled her in 4-5wks for an anatomic survey --> If patient is getting this done with MFFAISAL at [facility], please cancel this appointment as will be a duplicate   - Recommend patient have q 4 wk ultrasounds for growth after the 20wk anatomy survey   - Recommend patient have a fetal echo at 24wks   - Recommend patient start twice weekly testing at 32wks of pregnancy   - If patient does not already have baseline 24hr urine and preeclampsia labs, recommend these be collected   - Recommend patient have a baseline eye exam in pregnancy   - Very happy to help with diabetes management during pregnancy including weekly review of blood sugar, recommendations for medication changes, and recommendation for medication management surrounding delivery -- just let us know if this is needed so we can add her to our roster of DM patients that we follow closely

## 2024-04-09 NOTE — ASSESSMENT & PLAN NOTE
Patient with low fetal fraction x 2 likely secondary to maternal weight. Reviewed other genetic screening options and genetic diagnostic options. Patient opts for a Quad screen today.      - Quad screen sent

## 2024-04-15 ENCOUNTER — TELEPHONE (OUTPATIENT)
Dept: OBSTETRICS AND GYNECOLOGY | Facility: HOSPITAL | Age: 24
End: 2024-04-15
Payer: COMMERCIAL

## 2024-04-15 NOTE — TELEPHONE ENCOUNTER
Left message for Ms. Appiah to call the office for results of AFP4 and my chart message will be sent

## 2024-05-23 ENCOUNTER — TELEMEDICINE (OUTPATIENT)
Dept: FAMILY MEDICINE CLINIC | Facility: TELEHEALTH | Age: 24
End: 2024-05-23
Payer: COMMERCIAL

## 2024-05-23 DIAGNOSIS — H10.9 BACTERIAL CONJUNCTIVITIS OF BOTH EYES: ICD-10-CM

## 2024-05-23 DIAGNOSIS — J01.00 ACUTE NON-RECURRENT MAXILLARY SINUSITIS: Primary | ICD-10-CM

## 2024-05-23 DIAGNOSIS — B96.89 BACTERIAL CONJUNCTIVITIS OF BOTH EYES: ICD-10-CM

## 2024-05-23 PROBLEM — Z67.41 TYPE O BLOOD, RH NEGATIVE: Status: ACTIVE | Noted: 2024-03-19

## 2024-05-23 PROBLEM — Z34.92 SECOND TRIMESTER PREGNANCY: Status: ACTIVE | Noted: 2024-03-19

## 2024-05-23 PROBLEM — O24.119 PRE-EXISTING TYPE 2 DIABETES AFFECTING PREGNANCY, ANTEPARTUM: Status: ACTIVE | Noted: 2024-03-19

## 2024-05-23 PROBLEM — Z3A.13 13 WEEKS GESTATION OF PREGNANCY: Status: ACTIVE | Noted: 2024-03-19

## 2024-05-23 PROBLEM — Z34.90 PREGNANCY: Status: ACTIVE | Noted: 2024-05-23

## 2024-05-23 RX ORDER — BLOOD-GLUCOSE METER
EACH MISCELLANEOUS SEE ADMIN INSTRUCTIONS
COMMUNITY
Start: 2024-04-20

## 2024-05-23 RX ORDER — DIPHENHYD/PE/ACETAMINOPHEN/GG 25-5-325MG
TABLET, SEQUENTIAL ORAL
COMMUNITY
Start: 2024-04-21

## 2024-05-23 RX ORDER — POLYMYXIN B SULFATE AND TRIMETHOPRIM 1; 10000 MG/ML; [USP'U]/ML
1 SOLUTION OPHTHALMIC EVERY 4 HOURS
Qty: 10 ML | Refills: 0 | Status: SHIPPED | OUTPATIENT
Start: 2024-05-23 | End: 2024-05-30

## 2024-05-23 RX ORDER — INSULIN LISPRO 100 [IU]/ML
INJECTION, SOLUTION INTRAVENOUS; SUBCUTANEOUS
COMMUNITY
Start: 2024-05-20

## 2024-05-23 RX ORDER — AMOXICILLIN 875 MG/1
875 TABLET, COATED ORAL 2 TIMES DAILY
Qty: 20 TABLET | Refills: 0 | Status: SHIPPED | OUTPATIENT
Start: 2024-05-23 | End: 2024-06-02

## 2024-05-23 RX ORDER — CITALOPRAM 20 MG/1
1 TABLET ORAL EVERY MORNING
COMMUNITY

## 2024-05-23 RX ORDER — PEN NEEDLE, DIABETIC 31 GX5/16"
NEEDLE, DISPOSABLE MISCELLANEOUS
COMMUNITY
Start: 2024-05-20

## 2024-05-23 NOTE — PROGRESS NOTES
You have chosen to receive care through a telehealth visit.  Do you consent to use a video/audio connection for your medical care today? Yes     CHIEF COMPLAINT  Chief Complaint   Patient presents with    Conjunctivitis         HPI  Maria Alejandra Appiah is a 23 y.o. female  presents with complaint of eyes matted and crusting. Reports she is having yellow drainage from both eyes. Reports her symptoms started yesterday. Denies any eye pain or visual problems. Reports she has had congestion and stopped up nose for 1.5 weeks. Reports she has been blowing her nose a lot. Reports no fever or chills. No nausea or vomiting. Reports she is 22 weeks pregnant. Reports she is starting insulin today for DM. Reports in the past amoxicillin works well. Reports she has not been taking any medications for her symptoms.     Review of Systems   Constitutional:  Negative for chills, fatigue and fever.   HENT:  Positive for congestion and sinus pressure. Negative for ear discharge, ear pain, sinus pain and sore throat.    Eyes:  Positive for discharge, redness and itching. Negative for photophobia, pain and visual disturbance.   Respiratory:  Negative for cough, chest tightness, shortness of breath and wheezing.    Cardiovascular:  Negative for chest pain.   Gastrointestinal:  Negative for abdominal pain, diarrhea, nausea and vomiting.   Genitourinary:  Negative for dysuria.   Musculoskeletal:  Negative for back pain and myalgias.   Neurological:  Negative for dizziness and headaches.   Psychiatric/Behavioral: Negative.         Past Medical History:   Diagnosis Date    Anxiety     Depression 2013    Diabetes mellitus 2023    started on metformin by PCP    Insomnia     PCOS (polycystic ovarian syndrome)     Patient states it has been mentioned on several visits       Family History   Problem Relation Age of Onset    Hypertension Paternal Uncle        Social History     Socioeconomic History    Marital status:     Number of children: 0    Tobacco Use    Smoking status: Never     Passive exposure: Never    Smokeless tobacco: Never   Vaping Use    Vaping status: Never Used   Substance and Sexual Activity    Alcohol use: Not Currently    Drug use: Never    Sexual activity: Defer     Partners: Male     Birth control/protection: None       Maria Alejandra Appiah  reports that she has never smoked. She has never been exposed to tobacco smoke. She has never used smokeless tobacco. I have educated her on the risk of diseases from using tobacco products such as cancer, COPD, and heart disease.         I spent  1  minutes counseling the patient.              LMP 12/20/2023     PHYSICAL EXAM  Physical Exam   Constitutional: She is oriented to person, place, and time. She appears well-developed and well-nourished. No distress.   HENT:   Head: Normocephalic and atraumatic.   Right Ear: Hearing normal.   Left Ear: Hearing normal.   Nose: Rhinorrhea and congestion present. Right sinus exhibits maxillary sinus tenderness. Left sinus exhibits maxillary sinus tenderness.   Mouth/Throat: Mouth/Lips are normal.  Patient directed exam  Mild sinus pressure    Eyes: Conjunctivae and lids are normal. Right eye exhibits discharge. Right eye exhibits no edema. Left eye exhibits discharge. Left eye exhibits no edema. Right conjunctiva is injected. Left conjunctiva is injected.   Note yellow crusting to eyelashes    Pulmonary/Chest: Effort normal.  No respiratory distress.  Neurological: She is alert and oriented to person, place, and time.   Psychiatric: She has a normal mood and affect. Her speech is normal and behavior is normal.           Diagnoses and all orders for this visit:    1. Acute non-recurrent maxillary sinusitis (Primary)    2. Bacterial conjunctivitis of both eyes    Other orders  -     amoxicillin (AMOXIL) 875 MG tablet; Take 1 tablet by mouth 2 (Two) Times a Day for 10 days.  Dispense: 20 tablet; Refill: 0  -     trimethoprim-polymyxin b (POLYTRIM) 14411-9.1  UNIT/ML-% ophthalmic solution; Administer 1 drop to both eyes Every 4 (Four) Hours for 7 days.  Dispense: 10 mL; Refill: 0    Rest  Fluids   PCP if symptoms persist  ER for any worsening symptoms such as high fever, chest pain, shortness of breath, eye pain or visual problems      FOLLOW-UP  As discussed during visit with PCP/[facility] if no improvement or Urgent Care/Emergency Department if worsening of symptoms    Patient verbalizes understanding of medication dosage, comfort measures, instructions for treatment and follow-up.    June Kenny, APRN  05/23/2024  02:49 EDT    The use of a video visit has been reviewed with the patient and verbal informed consent has been obtained. Myself and Maria Alejandra Appiah participated in this visit. The patient is located in [address] [place] [address].    I am located in [place], [place]. [employer] and [employer] were utilized. I spent 5 minutes in the patient's chart for this visit.      Note Disclaimer: At [facility], we believe that sharing information builds trust and better   relationships. You are receiving this note because you recently visited [facility]. It is possible you   will see health information before a provider has talked with you about it. This kind of information can   be easy to misunderstand. To help you fully understand what it means for your health, we urge you to   discuss this note with your provider.